# Patient Record
Sex: MALE | Race: BLACK OR AFRICAN AMERICAN | NOT HISPANIC OR LATINO | Employment: FULL TIME | URBAN - METROPOLITAN AREA
[De-identification: names, ages, dates, MRNs, and addresses within clinical notes are randomized per-mention and may not be internally consistent; named-entity substitution may affect disease eponyms.]

---

## 2018-08-01 ENCOUNTER — HOSPITAL ENCOUNTER (EMERGENCY)
Facility: HOSPITAL | Age: 33
Discharge: HOME | End: 2018-08-01
Attending: EMERGENCY MEDICINE
Payer: MEDICAID

## 2018-08-01 VITALS
WEIGHT: 234.44 LBS | RESPIRATION RATE: 20 BRPM | OXYGEN SATURATION: 97 % | SYSTOLIC BLOOD PRESSURE: 137 MMHG | HEIGHT: 69 IN | BODY MASS INDEX: 34.72 KG/M2 | TEMPERATURE: 98 F | DIASTOLIC BLOOD PRESSURE: 79 MMHG | HEART RATE: 82 BPM
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
## 2018-08-01 DIAGNOSIS — S86.912A MUSCLE STRAIN OF LEFT LOWER EXTREMITY, INITIAL ENCOUNTER: ICD-10-CM

## 2018-08-01 DIAGNOSIS — R03.0 ELEVATED BLOOD PRESSURE READING: ICD-10-CM

## 2018-08-01 DIAGNOSIS — S46.912A MUSCLE STRAIN OF LEFT UPPER EXTREMITY, INITIAL ENCOUNTER: ICD-10-CM

## 2018-08-01 PROCEDURE — 99283 EMERGENCY DEPT VISIT LOW MDM: CPT

## 2018-08-01 RX ORDER — CYCLOBENZAPRINE HCL 10 MG
10 TABLET ORAL NIGHTLY PRN
Qty: 10 TABLET | Refills: 0 | Status: SHIPPED | OUTPATIENT
Start: 2018-08-01 | End: 2018-08-11

## 2018-08-01 RX ORDER — DICLOFENAC SODIUM 75 MG/1
75 TABLET, DELAYED RELEASE ORAL 2 TIMES DAILY
Qty: 10 TABLET | Refills: 0 | OUTPATIENT
Start: 2018-08-01 | End: 2020-08-10

## 2018-08-01 NOTE — ED NOTES
Aaox3, skin warm and dry, resp unlabored and even. Lange well and amb with steady gait. C/o left shoulder and left leg pain. Moving both freely and weight bearing well.no deform. Noted.
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
## 2019-05-15 ENCOUNTER — HOSPITAL ENCOUNTER (EMERGENCY)
Facility: HOSPITAL | Age: 34
Discharge: HOME | End: 2019-05-15
Attending: EMERGENCY MEDICINE
Payer: MEDICAID

## 2019-05-15 VITALS
TEMPERATURE: 98 F | RESPIRATION RATE: 18 BRPM | OXYGEN SATURATION: 100 % | BODY MASS INDEX: 33.08 KG/M2 | SYSTOLIC BLOOD PRESSURE: 122 MMHG | HEIGHT: 70 IN | DIASTOLIC BLOOD PRESSURE: 81 MMHG | WEIGHT: 231.06 LBS | HEART RATE: 95 BPM

## 2019-05-15 DIAGNOSIS — K52.9 COLITIS: Primary | ICD-10-CM

## 2019-05-15 LAB
ALBUMIN SERPL BCP-MCNC: 3.9 G/DL (ref 3.5–5.2)
ALP SERPL-CCNC: 79 U/L (ref 55–135)
ALT SERPL W/O P-5'-P-CCNC: 28 U/L (ref 10–44)
ANION GAP SERPL CALC-SCNC: 11 MMOL/L (ref 8–16)
AST SERPL-CCNC: 26 U/L (ref 10–40)
BACTERIA #/AREA URNS AUTO: ABNORMAL /HPF
BASOPHILS # BLD AUTO: 0.01 K/UL (ref 0–0.2)
BASOPHILS NFR BLD: 0.1 % (ref 0–1.9)
BILIRUB SERPL-MCNC: 1.1 MG/DL (ref 0.1–1)
BILIRUB UR QL STRIP: ABNORMAL
BUN SERPL-MCNC: 10 MG/DL (ref 6–20)
CALCIUM SERPL-MCNC: 9.4 MG/DL (ref 8.7–10.5)
CHLORIDE SERPL-SCNC: 106 MMOL/L (ref 95–110)
CLARITY UR REFRACT.AUTO: CLEAR
CO2 SERPL-SCNC: 21 MMOL/L (ref 23–29)
COLOR UR AUTO: YELLOW
CREAT SERPL-MCNC: 1.2 MG/DL (ref 0.5–1.4)
DIFFERENTIAL METHOD: ABNORMAL
EOSINOPHIL # BLD AUTO: 0 K/UL (ref 0–0.5)
EOSINOPHIL NFR BLD: 0.2 % (ref 0–8)
ERYTHROCYTE [DISTWIDTH] IN BLOOD BY AUTOMATED COUNT: 12.5 % (ref 11.5–14.5)
EST. GFR  (AFRICAN AMERICAN): >60 ML/MIN/1.73 M^2
EST. GFR  (NON AFRICAN AMERICAN): >60 ML/MIN/1.73 M^2
GLUCOSE SERPL-MCNC: 105 MG/DL (ref 70–110)
GLUCOSE UR QL STRIP: NEGATIVE
HCT VFR BLD AUTO: 41 % (ref 40–54)
HGB BLD-MCNC: 13.9 G/DL (ref 14–18)
HGB UR QL STRIP: ABNORMAL
KETONES UR QL STRIP: NEGATIVE
LEUKOCYTE ESTERASE UR QL STRIP: NEGATIVE
LIPASE SERPL-CCNC: 17 U/L (ref 4–60)
LYMPHOCYTES # BLD AUTO: 1.4 K/UL (ref 1–4.8)
LYMPHOCYTES NFR BLD: 12.7 % (ref 18–48)
MCH RBC QN AUTO: 32.3 PG (ref 27–31)
MCHC RBC AUTO-ENTMCNC: 33.9 G/DL (ref 32–36)
MCV RBC AUTO: 95 FL (ref 82–98)
MICROSCOPIC COMMENT: ABNORMAL
MONOCYTES # BLD AUTO: 0.9 K/UL (ref 0.3–1)
MONOCYTES NFR BLD: 8.5 % (ref 4–15)
NEUTROPHILS # BLD AUTO: 8.7 K/UL (ref 1.8–7.7)
NEUTROPHILS NFR BLD: 78.4 % (ref 38–73)
NITRITE UR QL STRIP: NEGATIVE
PH UR STRIP: 6 [PH] (ref 5–8)
PLATELET # BLD AUTO: 231 K/UL (ref 150–350)
PMV BLD AUTO: 10.1 FL (ref 9.2–12.9)
POTASSIUM SERPL-SCNC: 3.8 MMOL/L (ref 3.5–5.1)
PROT SERPL-MCNC: 7.5 G/DL (ref 6–8.4)
PROT UR QL STRIP: ABNORMAL
RBC # BLD AUTO: 4.3 M/UL (ref 4.6–6.2)
RBC #/AREA URNS AUTO: 8 /HPF (ref 0–4)
SODIUM SERPL-SCNC: 138 MMOL/L (ref 136–145)
SP GR UR STRIP: 1.02 (ref 1–1.03)
URN SPEC COLLECT METH UR: ABNORMAL
UROBILINOGEN UR STRIP-ACNC: NEGATIVE EU/DL
WBC # BLD AUTO: 11.09 K/UL (ref 3.9–12.7)

## 2019-05-15 PROCEDURE — 80053 COMPREHEN METABOLIC PANEL: CPT | Mod: ER

## 2019-05-15 PROCEDURE — 25000003 PHARM REV CODE 250: Mod: ER | Performed by: EMERGENCY MEDICINE

## 2019-05-15 PROCEDURE — 96360 HYDRATION IV INFUSION INIT: CPT | Mod: ER

## 2019-05-15 PROCEDURE — 85025 COMPLETE CBC W/AUTO DIFF WBC: CPT | Mod: ER

## 2019-05-15 PROCEDURE — 83690 ASSAY OF LIPASE: CPT | Mod: ER

## 2019-05-15 PROCEDURE — 81000 URINALYSIS NONAUTO W/SCOPE: CPT | Mod: ER

## 2019-05-15 PROCEDURE — 99285 EMERGENCY DEPT VISIT HI MDM: CPT | Mod: 25,ER

## 2019-05-15 PROCEDURE — 25500020 PHARM REV CODE 255: Mod: ER | Performed by: EMERGENCY MEDICINE

## 2019-05-15 RX ORDER — CIPROFLOXACIN 500 MG/1
500 TABLET ORAL 2 TIMES DAILY
Qty: 20 TABLET | Refills: 0 | Status: SHIPPED | OUTPATIENT
Start: 2019-05-15 | End: 2019-05-25

## 2019-05-15 RX ORDER — ONDANSETRON 4 MG/1
4 TABLET, FILM COATED ORAL EVERY 8 HOURS PRN
Qty: 12 TABLET | Refills: 0 | OUTPATIENT
Start: 2019-05-15 | End: 2020-08-10

## 2019-05-15 RX ORDER — DICYCLOMINE HYDROCHLORIDE 20 MG/1
20 TABLET ORAL 2 TIMES DAILY
Qty: 20 TABLET | Refills: 0 | Status: SHIPPED | OUTPATIENT
Start: 2019-05-15 | End: 2019-06-14

## 2019-05-15 RX ORDER — METRONIDAZOLE 500 MG/1
500 TABLET ORAL 3 TIMES DAILY
Qty: 21 TABLET | Refills: 0 | Status: SHIPPED | OUTPATIENT
Start: 2019-05-15 | End: 2019-05-22

## 2019-05-15 RX ADMIN — IOHEXOL 100 ML: 350 INJECTION, SOLUTION INTRAVENOUS at 12:05

## 2019-05-15 RX ADMIN — SODIUM CHLORIDE 1000 ML: 0.9 INJECTION, SOLUTION INTRAVENOUS at 11:05

## 2019-05-15 NOTE — ED PROVIDER NOTES
Encounter Date: 5/15/2019       History     Chief Complaint   Patient presents with    Abdominal Pain     stabbing like pain lower abd for 2 days interm.     The history is provided by the patient.   Abdominal Pain   The current episode started two days ago. The onset of the illness was gradual. The problem has not changed since onset.The abdominal pain is located in the suprapubic region, RLQ and LLQ. The abdominal pain does not radiate. The severity of the abdominal pain is 6/10. The abdominal pain is relieved by nothing. The abdominal pain is exacerbated by movement. The other symptoms of the illness include nausea. The other symptoms of the illness do not include fever, fatigue, vomiting, diarrhea or dysuria.     Review of patient's allergies indicates:   Allergen Reactions    Penicillins      History reviewed. No pertinent past medical history.  Past Surgical History:   Procedure Laterality Date    SHOULDER SURGERY Right      History reviewed. No pertinent family history.  Social History     Tobacco Use    Smoking status: Current Every Day Smoker     Packs/day: 0.00     Types: Cigarettes     Last attempt to quit: 4/10/2017     Years since quittin.0    Smokeless tobacco: Never Used    Tobacco comment: occa   Substance Use Topics    Alcohol use: Yes     Comment: occas beer    Drug use: No     Review of Systems   Constitutional: Negative for fatigue and fever.   Gastrointestinal: Positive for abdominal pain and nausea. Negative for diarrhea and vomiting.   Genitourinary: Negative for dysuria.   All other systems reviewed and are negative.      Physical Exam     Initial Vitals [05/15/19 1047]   BP Pulse Resp Temp SpO2   122/77 109 20 98.2 °F (36.8 °C) 99 %      MAP       --         Physical Exam    Nursing note and vitals reviewed.  Constitutional: He appears well-developed and well-nourished. He is not diaphoretic. No distress.   HENT:   Head: Normocephalic and atraumatic.   Mouth/Throat: Oropharynx is  clear and moist. No oropharyngeal exudate.   Eyes: Conjunctivae and EOM are normal. Pupils are equal, round, and reactive to light.   Neck: Normal range of motion. Neck supple. No JVD present.   Cardiovascular: Normal rate, regular rhythm, normal heart sounds and intact distal pulses. Exam reveals no gallop and no friction rub.    No murmur heard.  Pulmonary/Chest: Breath sounds normal. No respiratory distress. He has no wheezes. He has no rhonchi. He has no rales.   Abdominal: Soft. Bowel sounds are normal. He exhibits no distension and no mass. There is tenderness in the right lower quadrant and suprapubic area. There is no rebound and no guarding.   Musculoskeletal: Normal range of motion. He exhibits no edema or tenderness.   Lymphadenopathy:     He has no cervical adenopathy.   Neurological: He is alert and oriented to person, place, and time. He has normal strength and normal reflexes.   Skin: Skin is warm and dry. No rash noted. No erythema. No pallor.   Psychiatric: He has a normal mood and affect. Thought content normal.         ED Course   Procedures  Labs Reviewed   CBC W/ AUTO DIFFERENTIAL - Abnormal; Notable for the following components:       Result Value    RBC 4.30 (*)     Hemoglobin 13.9 (*)     Mean Corpuscular Hemoglobin 32.3 (*)     Gran # (ANC) 8.7 (*)     Gran% 78.4 (*)     Lymph% 12.7 (*)     All other components within normal limits   COMPREHENSIVE METABOLIC PANEL - Abnormal; Notable for the following components:    CO2 21 (*)     Total Bilirubin 1.1 (*)     All other components within normal limits   URINALYSIS, REFLEX TO URINE CULTURE - Abnormal; Notable for the following components:    Protein, UA Trace (*)     Bilirubin (UA) 2+ (*)     Occult Blood UA 3+ (*)     All other components within normal limits    Narrative:     Preferred Collection Type->Urine, Clean Catch   URINALYSIS MICROSCOPIC - Abnormal; Notable for the following components:    RBC, UA 8 (*)     All other components within  normal limits    Narrative:     Preferred Collection Type->Urine, Clean Catch   LIPASE     Results for orders placed or performed during the hospital encounter of 05/15/19   CBC W/ AUTO DIFFERENTIAL   Result Value Ref Range    WBC 11.09 3.90 - 12.70 K/uL    RBC 4.30 (L) 4.60 - 6.20 M/uL    Hemoglobin 13.9 (L) 14.0 - 18.0 g/dL    Hematocrit 41.0 40.0 - 54.0 %    Mean Corpuscular Volume 95 82 - 98 fL    Mean Corpuscular Hemoglobin 32.3 (H) 27.0 - 31.0 pg    Mean Corpuscular Hemoglobin Conc 33.9 32.0 - 36.0 g/dL    RDW 12.5 11.5 - 14.5 %    Platelets 231 150 - 350 K/uL    MPV 10.1 9.2 - 12.9 fL    Gran # (ANC) 8.7 (H) 1.8 - 7.7 K/uL    Lymph # 1.4 1.0 - 4.8 K/uL    Mono # 0.9 0.3 - 1.0 K/uL    Eos # 0.0 0.0 - 0.5 K/uL    Baso # 0.01 0.00 - 0.20 K/uL    Gran% 78.4 (H) 38.0 - 73.0 %    Lymph% 12.7 (L) 18.0 - 48.0 %    Mono% 8.5 4.0 - 15.0 %    Eosinophil% 0.2 0.0 - 8.0 %    Basophil% 0.1 0.0 - 1.9 %    Differential Method Automated    Comp. Metabolic Panel   Result Value Ref Range    Sodium 138 136 - 145 mmol/L    Potassium 3.8 3.5 - 5.1 mmol/L    Chloride 106 95 - 110 mmol/L    CO2 21 (L) 23 - 29 mmol/L    Glucose 105 70 - 110 mg/dL    BUN, Bld 10 6 - 20 mg/dL    Creatinine 1.2 0.5 - 1.4 mg/dL    Calcium 9.4 8.7 - 10.5 mg/dL    Total Protein 7.5 6.0 - 8.4 g/dL    Albumin 3.9 3.5 - 5.2 g/dL    Total Bilirubin 1.1 (H) 0.1 - 1.0 mg/dL    Alkaline Phosphatase 79 55 - 135 U/L    AST 26 10 - 40 U/L    ALT 28 10 - 44 U/L    Anion Gap 11 8 - 16 mmol/L    eGFR if African American >60.0 >60 mL/min/1.73 m^2    eGFR if non African American >60.0 >60 mL/min/1.73 m^2   Lipase   Result Value Ref Range    Lipase 17 4 - 60 U/L   Urinalysis, Reflex to Urine Culture Urine, Clean Catch   Result Value Ref Range    Specimen UA Urine, Clean Catch     Color, UA Yellow Yellow, Straw, Carlyn    Appearance, UA Clear Clear    pH, UA 6.0 5.0 - 8.0    Specific Gravity, UA 1.025 1.005 - 1.030    Protein, UA Trace (A) Negative    Glucose, UA Negative  Negative    Ketones, UA Negative Negative    Bilirubin (UA) 2+ (A) Negative    Occult Blood UA 3+ (A) Negative    Nitrite, UA Negative Negative    Urobilinogen, UA Negative <2.0 EU/dL    Leukocytes, UA Negative Negative   Urinalysis Microscopic   Result Value Ref Range    RBC, UA 8 (H) 0 - 4 /hpf    Bacteria Rare None-Occ /hpf    Microscopic Comment SEE COMMENT             Imaging Results          CT Abdomen Pelvis With Contrast (Final result)  Result time 05/15/19 13:18:24    Final result by Marquis Braden III, MD (05/15/19 13:18:24)                 Impression:      Mild pan proctocolitis and terminal ileitis suggesting ulcerative colitis or Crohn's disease.  Differential diagnosis includes infectious and other inflammatory etiologies.  No abscess or free air is present.    Normal appendix.      Electronically signed by: Marquis Braden MD  Date:    05/15/2019  Time:    13:18             Narrative:    EXAMINATION:  CT ABDOMEN PELVIS WITH CONTRAST    CLINICAL HISTORY:  Right lower quadrant abdominal pain, appendicitis suspected;    TECHNIQUE:  Routine abdominal and pelvic CT performed before and after the IV administration of 100 mL Omnipaque 350. Coronal and sagittal images obtained. All CT scans at this facility are performed  using dose modulation techniques as appropriate to performed exam including the following:  automated exposure control; adjustment of mA and/or kV according to the patients size (this includes techniques or standardized protocols for targeted exams where dose is matched to indication/reason for exam: i.e. extremities or head);  iterative reconstruction technique.    COMPARISON:  No prior abdominal CT available    FINDINGS:  There is mild bibasilar dependent atelectasis.  No acute disease is seen in the lung bases.  No acute osseous abnormality or suspicious bone marrow lesion identified.    There is mild pancolitis with mild wall thickening, mucosal hyperenhancement and submucosal edema or  fatty infiltration throughout the colon and rectum with mild patchy adjacent mesenteric fat stranding and prominence of the adjacent Vasa recta.  There is mild focal terminal ileitis with mild wall thickening and mucosal hyperenhancement of the terminal ileum.  The remainder of the small bowel is unremarkable.  There is no evidence of bowel obstruction. The stomach is within normal limits.  The appendix is normal without evidence of appendicitis. No free intraperitoneal fluid, free air or abscess identified.    There is diffuse fatty infiltration of the liver.  The liver is otherwise unremarkable.  The gallbladder and bile ducts are unremarkable. The pancreas, spleen and adrenals are unremarkable. No aortic aneurysm is identified. The kidneys and ureters are unremarkable. The bladder is within normal limits.  No pathologically enlarged lymph nodes are identified.                            1:38 PM - Counseling: Spoke with the patient and discussed todays findings, in addition to providing specific details for the plan of care and counseling regarding the diagnosis and prognosis. Questions are answered at this time.    I discussed with patient and/or family/caretaker that evaluation in the ED does not suggest any emergent or life threatening medical conditions requiring immediate intervention beyond what was provided in the ED, and I believe patient is safe for discharge.  Regardless, an unremarkable evaluation in the ED does not preclude the development or presence of a serious of life threatening condition. As such, patient was instructed to return immediately for any worsening or change in current symptoms.                            Clinical Impression:       ICD-10-CM ICD-9-CM   1. Colitis K52.9 558.9         Disposition:   Disposition: Discharged  Condition: Stable                        Guero Vazquez MD  05/15/19 2050

## 2019-05-28 ENCOUNTER — TELEPHONE (OUTPATIENT)
Dept: GASTROENTEROLOGY | Facility: CLINIC | Age: 34
End: 2019-05-28

## 2019-05-28 NOTE — TELEPHONE ENCOUNTER
----- Message from Lazara Espana sent at 5/28/2019  1:03 PM CDT -----  Type:  Needs Medical Advice    Who Called:  Pt wife  Ms Saunders  Symptoms (please be specific):     How long has patient had these symptoms:     Pharmacy name and phone #:     Would the patient rather a call back or a response via [facility]?  Call back  Best Call Back Number:  [phone]  Additional Information:  States pt went to the [facility] er and was told to followup with a Gastro Dr///for Abdominal pain//pt has Medicaid//please call/margarette/luna

## 2020-08-10 ENCOUNTER — HOSPITAL ENCOUNTER (EMERGENCY)
Facility: HOSPITAL | Age: 35
Discharge: HOME | End: 2020-08-10
Attending: EMERGENCY MEDICINE
Payer: OTHER GOVERNMENT

## 2020-08-10 VITALS
SYSTOLIC BLOOD PRESSURE: 146 MMHG | RESPIRATION RATE: 20 BRPM | OXYGEN SATURATION: 97 % | HEART RATE: 88 BPM | BODY MASS INDEX: 31.32 KG/M2 | HEIGHT: 71 IN | WEIGHT: 223.75 LBS | DIASTOLIC BLOOD PRESSURE: 76 MMHG | TEMPERATURE: 99 F

## 2020-08-10 DIAGNOSIS — Z20.822 EXPOSURE TO COVID-19 VIRUS: Primary | ICD-10-CM

## 2020-08-10 PROCEDURE — 86703 HIV-1/HIV-2 1 RESULT ANTBDY: CPT

## 2020-08-10 PROCEDURE — U0003 INFECTIOUS AGENT DETECTION BY NUCLEIC ACID (DNA OR RNA); SEVERE ACUTE RESPIRATORY SYNDROME CORONAVIRUS 2 (SARS-COV-2) (CORONAVIRUS DISEASE [COVID-19]), AMPLIFIED PROBE TECHNIQUE, MAKING USE OF HIGH THROUGHPUT TECHNOLOGIES AS DESCRIBED BY CMS-2020-01-R: HCPCS

## 2020-08-10 PROCEDURE — 99282 EMERGENCY DEPT VISIT SF MDM: CPT | Mod: ER

## 2020-08-10 NOTE — ED NOTES
Aaox3, skin warm and dry, resp unlabored and even. amb with steady gait and arnold well. C/o sinus pressure and chills and mother has covid.

## 2020-08-10 NOTE — Clinical Note
"Lamin"Joy Saunders was seen and treated in our emergency department on 8/10/2020.     COVID-19 is present in our communities across the state. There is limited testing for COVID at this time, so not all patients can be tested. In this situation, your employee meets the following criteria:    Lamin Saunders has met the criteria for COVID-19 testing based upon symptoms, travel, and/or potential exposure. The test has been completed and is pending results at this time. During this time the employee is not able to work and should be quarantined per the Centers for Disease Control timelines.     If you have any questions or concerns, or if I can be of further assistance, please do not hesitate to contact me.    Sincerely,             Kellie Garza PA-C"

## 2020-08-11 DIAGNOSIS — U07.1 COVID-19 VIRUS DETECTED: ICD-10-CM

## 2020-08-11 LAB
HIV 1+2 AB+HIV1 P24 AG SERPL QL IA: NEGATIVE
SARS-COV-2 RNA RESP QL NAA+PROBE: DETECTED

## 2020-08-11 NOTE — ED PROVIDER NOTES
History      Chief Complaint   Patient presents with    COVID-19 Concerns     fever this weekend. sinus pressure and chills today. mother has covid        Review of patient's allergies indicates:   Allergen Reactions    Penicillins         HPI   HPI    8/10/2020, 8:36 PM   History obtained from the patient      History of Present Illness: Lamin Saunders is a 34 y.o. male patient who presents to the Emergency Department for subjective fever this weekend, sinus pressure.  Mom has covid 19. Symptoms are moderate in severity.     No further complaints or concerns at this time.           PCP: Alison Matta NP       Past Medical History:  History reviewed. No pertinent past medical history.      Past Surgical History:  Past Surgical History:   Procedure Laterality Date    SHOULDER SURGERY Right            Family History:  History reviewed. No pertinent family history.        Social History:  Social History     Tobacco Use    Smoking status: Current Every Day Smoker     Packs/day: 0.00     Types: Cigarettes     Last attempt to quit: 4/10/2017     Years since quitting: 3.3    Smokeless tobacco: Never Used    Tobacco comment: occa   Substance and Sexual Activity    Alcohol use: Yes     Comment: occas beer    Drug use: No    Sexual activity: Yes       ROS     Review of Systems   Constitutional: Positive for chills and fever.   HENT: Positive for congestion. Negative for facial swelling and trouble swallowing.    Eyes: Negative for pain and discharge.   Respiratory: Negative for chest tightness and shortness of breath.    Cardiovascular: Negative for palpitations and leg swelling.   Gastrointestinal: Negative for diarrhea and vomiting.   Endocrine: Negative for polydipsia and polyuria.   Genitourinary: Negative for decreased urine volume and flank pain.   Musculoskeletal: Negative for joint swelling and neck stiffness.   Skin: Negative for rash and wound.   Neurological: Negative for syncope and  "light-headedness.   All other systems reviewed and are negative.      Physical Exam      Initial Vitals [08/10/20 1830]   BP Pulse Resp Temp SpO2   (!) 146/76 88 20 98.9 °F (37.2 °C) 97 %      MAP       --         Physical Exam  Vital signs and nursing notes reviewed.  Constitutional: Patient is in NAD. Awake and alert. Well-developed and well-nourished.  Head: Atraumatic. Normocephalic.  Eyes: PERRL. EOM intact. Conjunctivae nl. No scleral icterus.  ENT: Mucous membranes are moist. Oropharynx is clear.  No facial ttp  Neck: Supple. No JVD. No lymphadenopathy.  No meningismus  Cardiovascular: Regular rate and rhythm. No murmurs, rubs, or gallops. Distal pulses are 2+ and symmetric.  Pulmonary/Chest: No respiratory distress. Clear to auscultation bilaterally. No wheezing, rales, or rhonchi.  Abdominal: Soft. Non-distended. No TTP. No rebound, guarding, or rigidity. Good bowel sounds.  Genitourinary: No CVA tenderness  Musculoskeletal: Moves all extremities. No edema.   Skin: Warm and dry.  Neurological: Awake and alert. No acute focal neurological deficits are appreciated.  Psychiatric: Normal affect. Good eye contact. Appropriate in content.      ED Course          Procedures  ED Vital Signs:  Vitals:    08/10/20 1830   BP: (!) 146/76   Pulse: 88   Resp: 20   Temp: 98.9 °F (37.2 °C)   TempSrc: Oral   SpO2: 97%   Weight: 101.5 kg (223 lb 12.3 oz)   Height: 5' 11" (1.803 m)               Imaging Results:  Imaging Results    None            The Emergency Provider reviewed the vital signs and test results, which are outlined above.    ED Discussion             Medication(s) given in the ER:  Medications - No data to display        Follow-up Information     [facility].    Specialty: Emergency Medicine  Why: If symptoms worsen  Contact information:  [address]  [facility] [address] [phone]                        Medication List      STOP taking these medications    diclofenac 75 MG EC " tablet  Commonly known as: VOLTAREN     ondansetron 4 MG tablet  Commonly known as: ZOFRAN                Medical Decision Making      GAVE COVID 19 PRECAUTIONS.  RETURN TO ER IF DIFFICULTY BREATHING, UNABLE TO KEEP DOWN FLUIDS, ETC    All findings were reviewed with the patient/family in detail.   All remaining questions and concerns were addressed at that time.  Patient/family has been counseled regarding the need for follow-up as well as the indication to return to the emergency room should new or worrisome developments occur.        MDM               Clinical Impression:        ICD-10-CM ICD-9-CM   1. Exposure to Covid-19 Virus  Z20.828              Kellie Garza PA-C  08/10/20 2038

## 2020-11-03 ENCOUNTER — HOSPITAL ENCOUNTER (EMERGENCY)
Facility: HOSPITAL | Age: 35
Discharge: HOME | End: 2020-11-03
Attending: EMERGENCY MEDICINE

## 2020-11-03 VITALS
TEMPERATURE: 98 F | WEIGHT: 225.88 LBS | SYSTOLIC BLOOD PRESSURE: 140 MMHG | DIASTOLIC BLOOD PRESSURE: 90 MMHG | BODY MASS INDEX: 32.34 KG/M2 | HEART RATE: 67 BPM | RESPIRATION RATE: 20 BRPM | HEIGHT: 70 IN | OXYGEN SATURATION: 98 %

## 2020-11-03 DIAGNOSIS — M25.511 CHRONIC RIGHT SHOULDER PAIN: Primary | ICD-10-CM

## 2020-11-03 DIAGNOSIS — G89.29 CHRONIC RIGHT SHOULDER PAIN: Primary | ICD-10-CM

## 2020-11-03 DIAGNOSIS — M25.511 RIGHT SHOULDER PAIN: ICD-10-CM

## 2020-11-03 DIAGNOSIS — M19.011 ARTHRITIS OF RIGHT SHOULDER REGION: ICD-10-CM

## 2020-11-03 PROCEDURE — 99283 EMERGENCY DEPT VISIT LOW MDM: CPT | Mod: 25,ER

## 2020-11-03 PROCEDURE — 63600175 PHARM REV CODE 636 W HCPCS: Mod: ER | Performed by: NURSE PRACTITIONER

## 2020-11-03 PROCEDURE — 96372 THER/PROPH/DIAG INJ SC/IM: CPT | Mod: ER

## 2020-11-03 RX ORDER — ORPHENADRINE CITRATE 30 MG/ML
30 INJECTION INTRAMUSCULAR; INTRAVENOUS
Status: COMPLETED | OUTPATIENT
Start: 2020-11-03 | End: 2020-11-03

## 2020-11-03 RX ORDER — DEXAMETHASONE SODIUM PHOSPHATE 4 MG/ML
4 INJECTION, SOLUTION INTRA-ARTICULAR; INTRALESIONAL; INTRAMUSCULAR; INTRAVENOUS; SOFT TISSUE
Status: COMPLETED | OUTPATIENT
Start: 2020-11-03 | End: 2020-11-03

## 2020-11-03 RX ORDER — PREDNISONE 20 MG/1
40 TABLET ORAL DAILY
Qty: 10 TABLET | Refills: 0 | Status: SHIPPED | OUTPATIENT
Start: 2020-11-03 | End: 2020-11-08

## 2020-11-03 RX ORDER — DICLOFENAC SODIUM 50 MG/1
50 TABLET, DELAYED RELEASE ORAL 2 TIMES DAILY
Qty: 10 TABLET | Refills: 0 | Status: SHIPPED | OUTPATIENT
Start: 2020-11-03 | End: 2020-11-08

## 2020-11-03 RX ADMIN — ORPHENADRINE CITRATE 30 MG: 30 INJECTION INTRAMUSCULAR; INTRAVENOUS at 02:11

## 2020-11-03 RX ADMIN — DEXAMETHASONE SODIUM PHOSPHATE 4 MG: 4 INJECTION, SOLUTION INTRAMUSCULAR; INTRAVENOUS at 02:11

## 2020-11-03 NOTE — Clinical Note
"Lamin Saunders (Dex[place]) was seen and treated in our emergency department on 11/3/2020.  He may return to work on 11/06/2020.       If you have any questions or concerns, please don't hesitate to call.       RN    "

## 2020-11-03 NOTE — ED PROVIDER NOTES
HISTORY     Chief Complaint   Patient presents with    Shoulder Pain     right shoulder pain since yest. hx disclocation years ago     Review of patient's allergies indicates:   Allergen Reactions    Penicillins         HPI   35 year old male reports right shoulder injury approximately 20 year ago. Pt reports two days ago his shoulder began hurting him and the pain is worse with movement of the right shoulder. Pain does not radiate. Pt has tried nothing for the symptoms. Reports the pain is an aching pain. Pt denies any chest pain, sob, abdominal pain, back pain, fever, chills, and all other symptoms     The history is provided by the patient. No  was used.   Arm Injury   Pertinent negatives include no chest pain, no nausea and no weakness.        PCP: Alison Matta NP     Past Medical History:  History reviewed. No pertinent past medical history.     Past Surgical History:  Past Surgical History:   Procedure Laterality Date    SHOULDER SURGERY Right         Family History:  History reviewed. No pertinent family history.     Social History:  Social History     Tobacco Use    Smoking status: Current Every Day Smoker     Packs/day: 0.00     Types: Cigarettes     Last attempt to quit: 4/10/2017     Years since quitting: 3.5    Smokeless tobacco: Never Used    Tobacco comment: occa   Substance and Sexual Activity    Alcohol use: Yes     Comment: occas beer    Drug use: No    Sexual activity: Yes         ROS   Review of Systems   Constitutional: Negative for fever.   HENT: Negative for sore throat.    Respiratory: Negative for chest tightness and shortness of breath.    Cardiovascular: Negative for chest pain.   Gastrointestinal: Negative for nausea.   Genitourinary: Negative for dysuria.   Musculoskeletal: Positive for arthralgias (right shoulder). Negative for back pain.   Skin: Negative for rash.   Neurological: Negative for dizziness and weakness.   Hematological: Does not  "bruise/bleed easily.       PHYSICAL EXAM     Initial Vitals [11/03/20 1320]   BP Pulse Resp Temp SpO2   (!) 148/101 77 20 97.7 °F (36.5 °C) 98 %      MAP       --           Physical Exam    Nursing note and vitals reviewed.  Constitutional: He appears well-developed and well-nourished. He is not diaphoretic. No distress.   HENT:   Head: Normocephalic and atraumatic.   Eyes: Right eye exhibits no discharge. Left eye exhibits no discharge.   Neck: Normal range of motion. Neck supple.   Cardiovascular: Normal rate.   Pulmonary/Chest: No respiratory distress.   Abdominal: He exhibits no distension.   Musculoskeletal:      Comments: Decreased rom right shoulder secondary to pain. distal pulses 2+. Cap refill <2sec/ rue nvi. No obvious deformity    Neurological: He is alert and oriented to person, place, and time. He has normal strength.   Skin: Skin is warm and dry.   Psychiatric: He has a normal mood and affect. His behavior is normal. Thought content normal.          ED COURSE   Procedures  ED ONGOING VITALS:  Vitals:    11/03/20 1320   BP: (!) 148/101   Pulse: 77   Resp: 20   Temp: 97.7 °F (36.5 °C)   TempSrc: Oral   SpO2: 98%   Weight: 102.4 kg (225 lb 13.8 oz)   Height: 5' 10" (1.778 m)         ABNORMAL LAB VALUES:  Labs Reviewed - No data to display      ALL LAB VALUES:  none      RADIOLOGY STUDIES:  Imaging Results          X-Ray Shoulder Complete 2 View Right (Final result)  Result time 11/03/20 13:38:13    Final result by Dimas Hazel Jr., MD (11/03/20 13:38:13)                 Impression:      Focus of metal imbedded within the inferior glenoid with mild to moderate osteoarthritis of the glenohumeral joint.      Electronically signed by: Dimas Hazel Jr., MD  Date:    11/03/2020  Time:    13:38             Narrative:    EXAMINATION:  XR SHOULDER COMPLETE 2 OR MORE VIEWS RIGHT    CLINICAL HISTORY:  Pain in right shoulder    TECHNIQUE:  Two or three views of the right shoulder were " performed.    COMPARISON:  None    FINDINGS:  There is a metallic focus imbedded within the inferior aspect of the glenoid.  It measures roughly 6 mm.  Normal joint alignment.  There are small osteophytes at the inferior margin of the humeral head.  No acute fracture.  Visualized upper thorax is clear.                                          The above vital signs and test results have been reviewed by the emergency provider.     ED Medications:  Medications   dexamethasone injection 4 mg (has no administration in time range)   orphenadrine injection 30 mg (has no administration in time range)       .    Discharge Medications:  New Prescriptions    DICLOFENAC (VOLTAREN) 50 MG EC TABLET    Take 1 tablet (50 mg total) by mouth 2 (two) times daily. for 5 days    PREDNISONE (DELTASONE) 20 MG TABLET    Take 2 tablets (40 mg total) by mouth once daily. for 5 days      Follow-up Information     Alison Matta NP.    Specialty: Family Medicine  Why: As needed  Contact information:  [address] [place] [address]  [place] [address]  [phone]                  1:51 PM    I discussed with patient and/or family/caretaker that evaluation in the ED does not suggest any emergent or life threatening medical conditions requiring immediate intervention beyond what was provided in the ED, and I believe patient is safe for discharge. Regardless, an unremarkable evaluation in the ED does not preclude the development or presence of a serious or life threatening condition. As such, patient was instructed to return immediately for any worsening or change in current symptoms.    Pre-hypertension/Hypertension: The pt has been informed that they may have pre-hypertension or hypertension based on a blood pressure reading in the ED. I recommend that the pt call the PCP listed on their discharge instructions or a physician of their choice this week to arrange f/u for further evaluation of possible pre-hypertension or hypertension.       MEDICAL DECISION  MAKING                 CLINICAL IMPRESSION       ICD-10-CM ICD-9-CM   1. Chronic right shoulder pain  M25.511 719.41    G89.29 338.29   2. Right shoulder pain  M25.511 719.41   3. Arthritis of right shoulder region  M19.011 716.91       Disposition:   Disposition: Discharged  Condition: Stable         Angelo Ca NP  11/03/20 1821

## 2021-04-29 ENCOUNTER — PATIENT MESSAGE (OUTPATIENT)
Dept: RESEARCH | Facility: HOSPITAL | Age: 36
End: 2021-04-29

## 2021-07-01 ENCOUNTER — PATIENT MESSAGE (OUTPATIENT)
Dept: ADMINISTRATIVE | Facility: OTHER | Age: 36
End: 2021-07-01

## 2021-10-27 ENCOUNTER — HOSPITAL ENCOUNTER (EMERGENCY)
Facility: HOSPITAL | Age: 36
Discharge: HOME | End: 2021-10-27
Attending: EMERGENCY MEDICINE

## 2021-10-27 VITALS
SYSTOLIC BLOOD PRESSURE: 145 MMHG | DIASTOLIC BLOOD PRESSURE: 95 MMHG | WEIGHT: 236.56 LBS | RESPIRATION RATE: 16 BRPM | HEART RATE: 67 BPM | BODY MASS INDEX: 33.94 KG/M2 | OXYGEN SATURATION: 96 % | TEMPERATURE: 98 F

## 2021-10-27 DIAGNOSIS — T63.482A: Primary | ICD-10-CM

## 2021-10-27 PROCEDURE — 99283 EMERGENCY DEPT VISIT LOW MDM: CPT | Mod: ER

## 2021-10-27 RX ORDER — HYDROCORTISONE 1 %
CREAM (GRAM) TOPICAL
Qty: 30 G | Refills: 0 | Status: SHIPPED | OUTPATIENT
Start: 2021-10-27

## 2021-12-15 ENCOUNTER — HOSPITAL ENCOUNTER (EMERGENCY)
Facility: HOSPITAL | Age: 36
Discharge: HOME | End: 2021-12-15
Attending: EMERGENCY MEDICINE
Payer: COMMERCIAL

## 2021-12-15 VITALS
RESPIRATION RATE: 20 BRPM | BODY MASS INDEX: 30.63 KG/M2 | SYSTOLIC BLOOD PRESSURE: 157 MMHG | DIASTOLIC BLOOD PRESSURE: 92 MMHG | HEART RATE: 80 BPM | WEIGHT: 206.81 LBS | TEMPERATURE: 99 F | HEIGHT: 69 IN | OXYGEN SATURATION: 97 %
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
## 2021-12-15 PROCEDURE — 99284 EMERGENCY DEPT VISIT MOD MDM: CPT | Mod: 25,ER

## 2021-12-15 RX ORDER — CYCLOBENZAPRINE HCL 10 MG
10 TABLET ORAL 3 TIMES DAILY PRN
Qty: 15 TABLET | Refills: 0 | Status: SHIPPED | OUTPATIENT
Start: 2021-12-15 | End: 2021-12-20

## 2021-12-15 RX ORDER — KETOROLAC TROMETHAMINE 10 MG/1
10 TABLET, FILM COATED ORAL 3 TIMES DAILY
Qty: 15 TABLET | Refills: 0 | Status: SHIPPED | OUTPATIENT
Start: 2021-12-15 | End: 2021-12-20

## 2022-07-06 ENCOUNTER — HOSPITAL ENCOUNTER (EMERGENCY)
Facility: HOSPITAL | Age: 37
Discharge: HOME | End: 2022-07-06
Attending: EMERGENCY MEDICINE

## 2022-07-06 VITALS
OXYGEN SATURATION: 97 % | HEART RATE: 78 BPM | TEMPERATURE: 98 F | RESPIRATION RATE: 20 BRPM | BODY MASS INDEX: 36.95 KG/M2 | SYSTOLIC BLOOD PRESSURE: 146 MMHG | DIASTOLIC BLOOD PRESSURE: 98 MMHG | WEIGHT: 250.25 LBS

## 2022-07-06 DIAGNOSIS — M25.519 SHOULDER PAIN: Primary | ICD-10-CM

## 2022-07-06 LAB
HCV AB SERPL QL IA: NEGATIVE
HEP C VIRUS HOLD SPECIMEN: NORMAL
HIV 1+2 AB+HIV1 P24 AG SERPL QL IA: NEGATIVE

## 2022-07-06 PROCEDURE — 87389 HIV-1 AG W/HIV-1&-2 AB AG IA: CPT | Performed by: EMERGENCY MEDICINE

## 2022-07-06 PROCEDURE — 99284 EMERGENCY DEPT VISIT MOD MDM: CPT | Mod: ER

## 2022-07-06 PROCEDURE — 86803 HEPATITIS C AB TEST: CPT | Performed by: EMERGENCY MEDICINE

## 2022-07-06 RX ORDER — CYCLOBENZAPRINE HCL 10 MG
10 TABLET ORAL 3 TIMES DAILY PRN
Qty: 15 TABLET | Refills: 0 | Status: SHIPPED | OUTPATIENT
Start: 2022-07-06 | End: 2022-07-11

## 2022-07-06 RX ORDER — NAPROXEN 375 MG/1
375 TABLET ORAL 2 TIMES DAILY WITH MEALS
Qty: 30 TABLET | Refills: 0 | Status: SHIPPED | OUTPATIENT
Start: 2022-07-06

## 2022-07-06 NOTE — ED PROVIDER NOTES
Encounter Date: 2022       History     Chief Complaint   Patient presents with    Shoulder Pain     Pipe fell on right shoulder last Friday at work.      Patient states he had a large pipe fall on his right shoulder five days ago, and has continued pain since then.      The history is provided by the patient.   Shoulder Pain  This is a new problem. The current episode started more than 2 days ago. The problem occurs constantly. The problem has not changed since onset.Pertinent negatives include no chest pain, no abdominal pain, no headaches and no shortness of breath. Nothing aggravates the symptoms. Nothing relieves the symptoms.     Review of patient's allergies indicates:   Allergen Reactions    Penicillins      No past medical history on file.  Past Surgical History:   Procedure Laterality Date    SHOULDER SURGERY Right      No family history on file.  Social History     Tobacco Use    Smoking status: Current Every Day Smoker     Packs/day: 0.00     Types: Cigarettes     Last attempt to quit: 4/10/2017     Years since quittin.2    Smokeless tobacco: Never Used    Tobacco comment: occa   Substance Use Topics    Alcohol use: Yes     Comment: occas beer    Drug use: No     Review of Systems   Constitutional: Negative for fever.   HENT: Negative for sore throat.    Respiratory: Negative for shortness of breath.    Cardiovascular: Negative for chest pain.   Gastrointestinal: Negative for abdominal pain and nausea.   Genitourinary: Negative for dysuria.   Musculoskeletal: Negative for back pain.   Skin: Negative for rash.   Neurological: Negative for weakness and headaches.   Hematological: Does not bruise/bleed easily.       Physical Exam     Initial Vitals [22 0704]   BP Pulse Resp Temp SpO2   (!) 146/98 78 20 98.1 °F (36.7 °C) 97 %      MAP       --         Physical Exam    Nursing note and vitals reviewed.  Constitutional: He appears well-developed and well-nourished. No distress.   HENT:    Head: Normocephalic and atraumatic.   Mouth/Throat: Oropharynx is clear and moist.   Eyes: Conjunctivae and EOM are normal. Pupils are equal, round, and reactive to light.   Neck: Neck supple.   Normal range of motion.  Cardiovascular: Normal rate, regular rhythm and normal heart sounds. Exam reveals no gallop and no friction rub.    No murmur heard.  Pulmonary/Chest: Breath sounds normal. No respiratory distress. He has no wheezes. He has no rhonchi. He has no rales.   Abdominal: Abdomen is soft. Bowel sounds are normal. He exhibits no distension and no mass. There is no abdominal tenderness. There is no rebound and no guarding.   Musculoskeletal:         General: No edema. Normal range of motion.      Cervical back: Normal range of motion and neck supple.     Neurological: He is alert and oriented to person, place, and time. He has normal strength.   Skin: Skin is warm and dry. No rash noted.   Psychiatric: He has a normal mood and affect. Thought content normal.         ED Course   Procedures  Labs Reviewed   HIV 1 / 2 ANTIBODY   HEPATITIS C ANTIBODY   HEP C VIRUS HOLD SPECIMEN          Imaging Results          X-Ray Shoulder Trauma Right (Final result)  Result time 07/06/22 07:42:26    Final result by Kade Sal MD (07/06/22 07:42:26)                 Impression:      No acute abnormality, as above.      Electronically signed by: Kade Sal  Date:    07/06/2022  Time:    07:42             Narrative:    EXAMINATION:  XR SHOULDER TRAUMA 3 VIEW RIGHT    CLINICAL HISTORY:  Pain in unspecified shoulder    TECHNIQUE:  Three or four views of the right shoulder were performed.    COMPARISON:  Right shoulder radiograph 11/03/2020    FINDINGS:  Tiny radiopaque focus at the anterior/inferior glenoid, stable.  Mild glenohumeral degenerative changes with a small inferomedial humeral head osteophyte.  Satisfactory preservation of the joint spaces.  AC joint is unremarkable.  No acute fracture or dislocation is  evident.                                 Medications - No data to display                       Clinical Impression:   Final diagnoses:  [M25.519] Shoulder pain (Primary)          ED Disposition Condition    Discharge Stable        ED Prescriptions     Medication Sig Dispense Start Date End Date Auth. Provider    naproxen (NAPROSYN) 375 MG tablet Take 1 tablet (375 mg total) by mouth 2 (two) times daily with meals. 30 tablet 7/6/2022  Jose J Healy MD    cyclobenzaprine (FLEXERIL) 10 MG tablet Take 1 tablet (10 mg total) by mouth 3 (three) times daily as needed for Muscle spasms. 15 tablet 7/6/2022 7/11/2022 Jose J Healy MD        Follow-up Information     Follow up With Specialties Details Why Contact Info    Alison Matta, [facility]   [address] [facility] [address]  [phone]             Jose J Healy MD  07/06/22 0759

## 2022-07-06 NOTE — Clinical Note
"Lamin Saunders ([place]) was seen and treated in our emergency department on 7/6/2022.  He may return to work on 07/08/2022.       If you have any questions or concerns, please don't hesitate to call.       RN    "

## 2023-04-11 ENCOUNTER — PATIENT MESSAGE (OUTPATIENT)
Dept: RESEARCH | Facility: HOSPITAL | Age: 38
End: 2023-04-11
Payer: COMMERCIAL